# Patient Record
Sex: FEMALE | Race: BLACK OR AFRICAN AMERICAN | Employment: STUDENT | URBAN - METROPOLITAN AREA
[De-identification: names, ages, dates, MRNs, and addresses within clinical notes are randomized per-mention and may not be internally consistent; named-entity substitution may affect disease eponyms.]

---

## 2019-06-04 ENCOUNTER — OFFICE VISIT (OUTPATIENT)
Dept: ORTHOPEDIC SURGERY | Age: 29
End: 2019-06-04

## 2019-06-04 VITALS
OXYGEN SATURATION: 98 % | TEMPERATURE: 96.2 F | DIASTOLIC BLOOD PRESSURE: 63 MMHG | WEIGHT: 151.4 LBS | HEIGHT: 65 IN | HEART RATE: 62 BPM | RESPIRATION RATE: 16 BRPM | BODY MASS INDEX: 25.22 KG/M2 | SYSTOLIC BLOOD PRESSURE: 98 MMHG

## 2019-06-04 DIAGNOSIS — M76.52 PATELLAR TENDINITIS, LEFT KNEE: Primary | ICD-10-CM

## 2019-06-04 DIAGNOSIS — M25.562 LEFT KNEE PAIN, UNSPECIFIED CHRONICITY: ICD-10-CM

## 2019-06-04 RX ORDER — MELOXICAM 15 MG/1
15 TABLET ORAL
Qty: 30 TAB | Refills: 1 | Status: SHIPPED | OUTPATIENT
Start: 2019-06-04

## 2019-06-04 NOTE — PROGRESS NOTES
Mona Orozco  1990   Chief Complaint   Patient presents with    Knee Pain     LEFT KNEE PAIN        HISTORY OF PRESENT ILLNESS  Mona Orozco is a 34 y.o. female who presents today for evaluation of left knee pain. She rates her pain 0/10 today. Pain has been present for a few months after a fall at boot camp. She works as a  at [employer]. Patient describes the pain as aching that is Intermittent in nature. Symptoms are worse with prolonged walking, running, Activity and is better with  Rest. Associated symptoms include Swelling. Since problem started, it: is unchanged. Pain does not wake patient up at night. Has taken no meds for the problem. Has tried following treatments: Injections:NO; Brace:NO; Therapy:NO; Cane/Crutch:NO       Allergies   Allergen Reactions    Sulfa (Sulfonamide Antibiotics) Hives        History reviewed. No pertinent past medical history.    Social History     Socioeconomic History    Marital status: SINGLE     Spouse name: Not on file    Number of children: Not on file    Years of education: Not on file    Highest education level: Not on file   Occupational History    Not on file   Social Needs    Financial resource strain: Not on file    Food insecurity:     Worry: Not on file     Inability: Not on file    Transportation needs:     Medical: Not on file     Non-medical: Not on file   Tobacco Use    Smoking status: Never Smoker    Smokeless tobacco: Never Used   Substance and Sexual Activity    Alcohol use: No     Alcohol/week: 0.0 oz    Drug use: Not on file    Sexual activity: Not on file   Lifestyle    Physical activity:     Days per week: Not on file     Minutes per session: Not on file    Stress: Not on file   Relationships    Social connections:     Talks on phone: Not on file     Gets together: Not on file     Attends Advent service: Not on file     Active member of club or organization: Not on file     Attends meetings of clubs or organizations: Not on file     Relationship status: Not on file    Intimate partner violence:     Fear of current or ex partner: Not on file     Emotionally abused: Not on file     Physically abused: Not on file     Forced sexual activity: Not on file   Other Topics Concern    Not on file   Social History Narrative    Not on file      History reviewed. No pertinent surgical history. History reviewed. No pertinent family history. Current Outpatient Medications   Medication Sig    doxycycline (MONODOX) 100 mg capsule Take 100 mg by mouth two (2) times a day.  methocarbamol (ROBAXIN-750) 750 mg tablet Take 1 Tab by mouth four (4) times daily.  acetaminophen-codeine (TYLENOL-CODEINE #3) 300-30 mg per tablet Take 1 Tab by mouth every four (4) hours as needed for Pain. No current facility-administered medications for this visit. REVIEW OF SYSTEM   Patient denies: Weight loss, Fever/Chills, HA, Visual changes, Fatigue, Chest pain, SOB, Abdominal pain, N/V/D/C, Blood in stool or urine, Edema. Pertinent positive as above in HPI. All others were negative    PHYSICAL EXAM:   Visit Vitals  BP 98/63   Pulse 62   Temp 96.2 °F (35.7 °C) (Oral)   Resp 16   Ht 5' 5\" (1.651 m)   Wt 151 lb 6.4 oz (68.7 kg)   SpO2 98%   BMI 25.19 kg/m²     The patient is a well-developed, well-nourished female   in no acute distress. The patient is alert and oriented times three. The patient is alert and oriented times three. Mood and affect are normal.  LYMPHATIC: lymph nodes are not enlarged and are within normal limits  SKIN: normal in color and non tender to palpation. There are no bruises or abrasions noted. NEUROLOGICAL: Motor sensory exam is within normal limits. Reflexes are equal bilaterally.  There is normal sensation to pinprick and light touch  MUSCULOSKELETAL:  Examination Left knee   Skin Intact   Range of motion 0-130   Effusion -   Medial joint line tenderness -   Lateral joint line tenderness -   Tenderness Pes Bursa -   Tenderness insertion MCL -   Tenderness insertion LCL -   Lisandras -   Patella crepitus -   Patella grind -   Lachman -   Pivot shift -   Anterior drawer -   Posterior drawer -   Varus stress -   Valgus stress -   Neurovascular Intact   Calf Swelling and Tenderness to Palpation -   Corby's Test -   Hamstring Cord Tightness +      IMAGING: XR of left knee dated 6/4/19 was reviewed and read: No acute abnormalities. IMPRESSION:      ICD-10-CM ICD-9-CM    1. Patellar tendinitis, left knee M76.52 726.64 meloxicam (MOBIC) 15 mg tablet   2. Left knee pain, unspecified chronicity M25.562 719.46 AMB POC XRAY, KNEE; 1/2 VIEWS      meloxicam (MOBIC) 15 mg tablet        PLAN:  1. The patient presents today with left knee pain due to patellar tendinitis. Given hamstring stretches today. Risk factors include: n/a  2. No ultrasound exam indicated today  3. No cortisone injection indicated today   4. No Physical/Occupational Therapy indicated today  5. No diagnostic test indicated today:   6. No durable medical equipment indicated today  7. No referral indicated today   8. Yes medications indicated today: MOBIC   9. No Narcotic indicated today     RTC 6 weeks if pain continues    Scribed by Kim Gold ([address]) as dictated by Jayro Love MD    I, Dr. Jayro Love, confirm that all documentation is accurate.     Jayro Love M.D.   [facility]

## 2019-06-04 NOTE — LETTER
NOTIFICATION RETURN TO WORK / SCHOOL 
 
6/4/2019 10:12 AM 
 
Ms. Cleveland Hale 
[address] [facility] [address] To Whom It May Concern: 
 
Cleveland Hale is currently under the care of Guerrero Hale. She was seen in our office today, 6/4/19. 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 If there are questions or concerns please have the patient contact our office. Sincerely, Imelda Rice MD

## 2020-04-15 ENCOUNTER — HOSPITAL ENCOUNTER (EMERGENCY)
Age: 30
Discharge: HOME | End: 2020-04-15
Attending: EMERGENCY MEDICINE
Payer: MEDICAID

## 2020-04-15 VITALS
RESPIRATION RATE: 12 BRPM | TEMPERATURE: 97.3 F | BODY MASS INDEX: 23.16 KG/M2 | WEIGHT: 139 LBS | DIASTOLIC BLOOD PRESSURE: 65 MMHG | HEART RATE: 66 BPM | SYSTOLIC BLOOD PRESSURE: 108 MMHG | OXYGEN SATURATION: 100 % | HEIGHT: 65 IN

## 2020-04-15 DIAGNOSIS — L23.9 ALLERGIC DERMATITIS: Primary | ICD-10-CM

## 2020-04-15 PROCEDURE — 99281 EMR DPT VST MAYX REQ PHY/QHP: CPT

## 2020-04-15 RX ORDER — PREDNISONE 20 MG/1
40 TABLET ORAL DAILY
Qty: 10 TAB | Refills: 0 | Status: SHIPPED | OUTPATIENT
Start: 2020-04-15 | End: 2020-04-20

## 2020-04-15 NOTE — ED PROVIDER NOTES
EMERGENCY DEPARTMENT HISTORY AND PHYSICAL EXAM    3:52 PM      Date: 4/15/2020  Patient Name: Gaetano Ricketts    History of Presenting Illness     Chief Complaint   Patient presents with    Rash         History Provided By: Patient  Location/Duration/Severity/Modifying factors   Patient is a 60-year-old female with no significant past medical history presenting to the emergency department after experiencing several weeks of worsening pruritic rash. Patient reports that rash started on her hands and has spread to her neck face and forearms. She states that the rash is very itchy but she denies any previous history of pustules or draining skin lesions. She states that she has been seen previously by urgent care several times for this issue and given triamcinolone cream, ketoconazole cream, and an antihistamine which did not help her symptoms. 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 She states that she wears protective clothing and a respirator at all times when around the paint denies any history of similar symptoms. She denies any fever, shortness of breath, chest pain, abdominal pain, dysuria. She reports allergy to sulfur. PCP: None    Current Outpatient Medications   Medication Sig Dispense Refill    predniSONE (DELTASONE) 20 mg tablet Take 40 mg by mouth daily for 5 days. With Breakfast 10 Tab 0    meloxicam (MOBIC) 15 mg tablet Take 1 Tab by mouth daily (with breakfast). 30 Tab 1    doxycycline (MONODOX) 100 mg capsule Take 100 mg by mouth two (2) times a day.  methocarbamol (ROBAXIN-750) 750 mg tablet Take 1 Tab by mouth four (4) times daily. 20 Tab 0    acetaminophen-codeine (TYLENOL-CODEINE #3) 300-30 mg per tablet Take 1 Tab by mouth every four (4) hours as needed for Pain. 10 Tab 0       Past History     Past Medical History:  History reviewed. No pertinent past medical history.     Past Surgical History:  History reviewed. No pertinent surgical history. Family History:  History reviewed. No pertinent family history. Social History:  Social History     Tobacco Use    Smoking status: Never Smoker    Smokeless tobacco: Never Used   Substance Use Topics    Alcohol use: No     Alcohol/week: 0.0 standard drinks    Drug use: Not on file       Allergies: Allergies   Allergen Reactions    Sulfa (Sulfonamide Antibiotics) Hives         Review of Systems       Review of Systems   Constitutional: Negative for fever. HENT: Negative for sore throat. Eyes: Negative for visual disturbance. Respiratory: Negative for cough, chest tightness, shortness of breath and wheezing. Cardiovascular: Negative for chest pain. Gastrointestinal: Negative for abdominal pain, nausea and vomiting. Genitourinary: Negative for dysuria. Skin: Positive for rash. Negative for color change, pallor and wound. Neurological: Negative for seizures and syncope. Physical Exam     Visit Vitals  /65 (BP 1 Location: Left arm, BP Patient Position: At rest)   Pulse 66   Temp 97.3 °F (36.3 °C)   Resp 12   Ht 5' 5\" (1.651 m)   Wt 63 kg (139 lb)   SpO2 100%   BMI 23.13 kg/m²         Physical Exam  Vitals signs and nursing note reviewed. Constitutional:       General: She is not in acute distress. Appearance: Normal appearance. She is normal weight. She is not ill-appearing, toxic-appearing or diaphoretic. HENT:      Head: Normocephalic and atraumatic. Cardiovascular:      Rate and Rhythm: Normal rate and regular rhythm. Pulses: Normal pulses. Heart sounds: Normal heart sounds. No murmur. No friction rub. No gallop. Pulmonary:      Effort: Pulmonary effort is normal. No respiratory distress. Breath sounds: Normal breath sounds. No stridor. No wheezing, rhonchi or rales. Chest:      Chest wall: No tenderness. Abdominal:      General: Abdomen is flat. Palpations: Abdomen is soft. Tenderness:  There is no abdominal tenderness. Musculoskeletal: Normal range of motion. Skin:     General: Skin is warm and dry. Capillary Refill: Capillary refill takes less than 2 seconds. Findings: Rash present. Comments: Mildly erythematous maculopapular rash. No pustules or severe excoriation noted. Rash is difficult to appreciate on extremities but is more prevalent on her cheekbones bilaterally. Neurological:      General: No focal deficit present. Mental Status: She is alert and oriented to person, place, and time. Mental status is at baseline. Psychiatric:         Mood and Affect: Mood normal.         Behavior: Behavior normal.         Thought Content: Thought content normal.         Judgment: Judgment normal.           Diagnostic Study Results     Labs -  No results found for this or any previous visit (from the past 12 hour(s)). Radiologic Studies -   No orders to display         Medical Decision Making   I am the first provider for this patient. I reviewed the vital signs, available nursing notes, past medical history, past surgical history, family history and social history. Vital Signs-Reviewed the patient's vital signs. EKG: None obtained    Records Reviewed: Nursing Notes and Old Medical Records (Time of Review: 3:52 PM)    ED Course: Progress Notes, Reevaluation, and Consults:         Provider Notes (Medical Decision Making):   MDM  Number of Diagnoses or Management Options  Allergic dermatitis:   Diagnosis management comments: Patient is a 82-year-old otherwise healthy female presenting to the emergency department after experiencing several weeks of spreading pruritic rash on the hands neck and face. Patient has tried and failed treatments with triamcinolone, ketoconazole, and oral antihistamine. Patient is exposed to chemicals on a daily basis at work but denies any previous problems with allergies.   Physical exam reassuring against insect bites such as bedbugs or scabies. I do not appear to be any pustules or concerning symptoms such as fevers. Patient is likely experiencing uncontrolled contact dermatitis likely from chemicals at work. Patient was counseled that allergies can develop at any time and it is possible she is developed an allergy to the chemicals at her work. This is supported by the fact that she wears a respirator at work and has not experienced rash symptoms wear a respirator has been sitting. Patient has tried topical treatments for allergic dermatitis and the next up would likely be a short course of oral steroids. Patient to be discharged home with prescription for 5 days of oral prednisone and instructions for following up with dermatology for further evaluation. Patient was also given return precautions and instructions to follow-up with her primary care. Patient verbalized understanding of these instructions and was comfortable with discharge at this time. Procedures: none    Critical Care Time: none      Diagnosis     Clinical Impression:   1. Allergic dermatitis        Disposition: Discharge home. Follow-up Information     Follow up With Specialties Details Why Contact Info    Your primary care doctor  Schedule an appointment as soon as possible for a visit      CR DERMATOLOGY Dermatology Schedule an appointment as soon as possible for a visit  [address] [facility] EMERGENCY DEPT Emergency Medicine Go to As needed, If symptoms worsen [address] [place] [id number]  [phone]           Patient's Medications   Start Taking    PREDNISONE (DELTASONE) 20 MG TABLET    Take 40 mg by mouth daily for 5 days. With Breakfast   Continue Taking    ACETAMINOPHEN-CODEINE (TYLENOL-CODEINE #3) 300-30 MG PER TABLET    Take 1 Tab by mouth every four (4) hours as needed for Pain. DOXYCYCLINE (MONODOX) 100 MG CAPSULE    Take 100 mg by mouth two (2) times a day.     MELOXICAM (MOBIC) 15 MG TABLET    Take 1 Tab by mouth daily (with breakfast). METHOCARBAMOL (ROBAXIN-750) 750 MG TABLET    Take 1 Tab by mouth four (4) times daily. These Medications have changed    No medications on file   Stop Taking    No medications on file     Disclaimer: Sections of this note are dictated using utilizing voice recognition software. Minor typographical errors may be present. If questions arise, please do not hesitate to contact me or call our department. Denny Rolle MD  [address] [place] [address] PGY-1

## 2020-04-15 NOTE — LETTER
[facility] EMERGENCY DEPT 
[address] [facility] [address] [phone] Work Note Date: 4/15/2020 To Whom It May concern: 
 
Dayna Ta was seen and treated today in the emergency department by Dr. Maryam Levine. Dayna Ta may return to work today.  
 
Sincerely, 
 
 
 
 
Javier Paiz MD

## 2020-04-15 NOTE — ED TRIAGE NOTES
\"I'm a  for the [employer]. I've been breaking out on my hands, but now the rash has started on my face and neck. It very itchy. \"  The patient is currently on Hydroxyzine HCL 25 mg Q 8 hours, Triamcinolone 0.1% cream, and Ketoconazole 2% cream, but have not been effective.

## 2020-04-15 NOTE — DISCHARGE INSTRUCTIONS
Patient Education        Dermatitis: Care Instructions  Your Care Instructions  Dermatitis is the general name used for any rash or inflammation of the skin. Different kinds of dermatitis cause different kinds of rashes. Common causes of a rash include new medicines, plants (such as poison oak or poison ivy), heat, and stress. Certain illnesses can also cause a rash. An allergic reaction to something that touches your skin, such as latex, nickel, or poison ivy, is called contact dermatitis. Contact dermatitis may also be caused by something that irritates the skin, such as bleach, a chemical, or soap. These types of rashes cannot be spread from person to person. How long your rash will last depends on what caused it. Rashes may last a few days or months. Follow-up care is a key part of your treatment and safety. Be sure to make and go to all appointments, and call your doctor if you are having problems. It's also a good idea to know your test results and keep a list of the medicines you take. How can you care for yourself at home? · Do not scratch the rash. Cut your nails short, and file them smooth. Or wear gloves if this helps keep you from scratching. · Wash the area with water only. Pat dry. · Put cold, wet cloths on the rash to reduce itching. · Keep cool, and stay out of the sun. · Leave the rash open to the air as much as possible. · If the rash itches, use hydrocortisone cream. Follow the directions on the label. Calamine lotion may help for plant rashes. · Take an over-the-counter antihistamine, such as diphenhydramine (Benadryl) or loratadine (Claritin), to help calm the itching. Read and follow all instructions on the label. · If your doctor prescribed a cream, use it as directed. If your doctor prescribed medicine, take it exactly as directed. When should you call for help?   Call your doctor now or seek immediate medical care if:    · You have symptoms of infection, such as:  ? Increased pain, swelling, warmth, or redness. ? Red streaks leading from the area. ? Pus draining from the area. ? A fever.     · You have joint pain along with the rash.    Watch closely for changes in your health, and be sure to contact your doctor if:    · Your rash is changing or getting worse.     · You are not getting better as expected. Where can you learn more? Go to http://elfego-rey.info/  Enter F270 in the search box to learn more about \"Dermatitis: Care Instructions. \"  Current as of: October 30, 2019Content Version: 12.4  © 2569-3079 [employer]wise[employer] Incorporated. Care instructions adapted under license by [employer] (which disclaims liability or warranty for this information). If you have questions about a medical condition or this instruction, always ask your healthcare professional. [address] any warranty or liability for your use of this information.

## 2022-09-16 ENCOUNTER — OFFICE VISIT (OUTPATIENT)
Dept: ORTHOPEDIC SURGERY | Age: 32
End: 2022-09-16
Payer: COMMERCIAL

## 2022-09-16 VITALS
TEMPERATURE: 98.4 F | BODY MASS INDEX: 23.82 KG/M2 | HEART RATE: 97 BPM | OXYGEN SATURATION: 97 % | WEIGHT: 143 LBS | HEIGHT: 65 IN

## 2022-09-16 DIAGNOSIS — G35 MULTIPLE SCLEROSIS (HCC): ICD-10-CM

## 2022-09-16 DIAGNOSIS — M79.602 LEFT ARM PAIN: ICD-10-CM

## 2022-09-16 DIAGNOSIS — G56.02 LEFT CARPAL TUNNEL SYNDROME: ICD-10-CM

## 2022-09-16 DIAGNOSIS — S56.912A FOREARM STRAIN, LEFT, INITIAL ENCOUNTER: Primary | ICD-10-CM

## 2022-09-16 PROCEDURE — 99203 OFFICE O/P NEW LOW 30 MIN: CPT | Performed by: SPECIALIST

## 2022-09-16 RX ORDER — DICLOFENAC SODIUM 10 MG/G
GEL TOPICAL 4 TIMES DAILY
Qty: 5 EACH | Refills: 5 | Status: SHIPPED | OUTPATIENT
Start: 2022-09-16

## 2022-09-16 NOTE — LETTER
NOTIFICATION RETURN TO WORK     9/16/2022 2:51 PM    Ms. Cleveland Hale  [facility] [address] [id number]      To Whom It May Concern:    Cleveland Hale is currently under the care of [address] [place] [address]. She will return to work on: 9-21-22. Please excuse her from work until then. If there are questions or concerns please have the patient contact our office.         Sincerely,        Olga Monte MD

## 2022-09-16 NOTE — PROGRESS NOTES
Patient: Dayna Ta                MRN: [id number]       SSN: [id number]  YOB: 1990        AGE: 29 y.o. SEX: female    PCP: None  09/16/22    CC: LEFT WRIST PAIN AND NUMBNESS    HISTORY:  Dayna Ta is a 29 y.o. female who is seen for left wrist pain and numbness. She has been experiencing pain and numbness in her left wrist/arm for the past couple of weeks. She was lifting a hamper of clothes at home when she first felt the pain. She has trouble with putting pressure on the area and with lifting. She feels numbness in her forearm and fingers. She is right handed. She was diagnosed with carpel tunnel syndrome within this pat year. She was diagnosed with multiple sclerosis within this past year as well. Takes dimethyl fumarate 240 mg twice a day. Pain Assessment  9/16/2022   Location of Pain Wrist;Hand   Location Modifiers Left   Severity of Pain 3   Quality of Pain Sharp   Quality of Pain Comment -   Duration of Pain Persistent   Frequency of Pain Intermittent   Date Pain First Started (No Data)   Date Pain First Started Comment two weeks ago   Aggravating Factors Other (Comment)   Aggravating Factors Comment weight of objects or moving objects   Limiting Behavior No   Relieving Factors Rest;Ice   Result of Injury No     Occupation, etc:  Ms. Pinky Chase works as a  for [employer]. She lives in [place] with her mom, dad, brother and 2 aunts. Ms. Pinky Chase weighs 143 lbs and is 5'5\" tall. She is not diabetic. She has a h/o multiple sclerosis diagnosed last year by her neurologist in [place].       No results found for: HBA1C, TEJ3IAAA, BUP1CRLG, MVC2UJUP  Weight Metrics 9/16/2022 4/15/2020 6/4/2019 6/27/2016 1/8/2014   Weight 143 lb 139 lb 151 lb 6.4 oz 141 lb 150 lb   BMI 23.8 kg/m2 23.13 kg/m2 25.19 kg/m2 23.46 kg/m2 24.96 kg/m2       Patient Active Problem List   Diagnosis Code    Carpal tunnel syndrome on both sides G56.03 Cubital tunnel syndrome, bilateral G56.23     REVIEW OF SYSTEMS:    Constitutional Symptoms: Negative   Eyes: Negative   Ears, Nose, Throat and Mouth: Negative   Cardiovascular: Negative   Respiratory: Negative   Genitourinary: Per HPI   Gastrointestinal: Per HPI   Integumentary (Skin and/or Breast): Negative   Musculoskeletal: Per HPI   Endocrine/Rheumatologic: Negative   Neurological: Per HPI   Hematology/Lymphatic: Negative    Allergic/Immunologic: Negative   Phychiatric: Negative    Social History     Socioeconomic History    Marital status: SINGLE     Spouse name: Not on file    Number of children: Not on file    Years of education: Not on file    Highest education level: Not on file   Occupational History    Not on file   Tobacco Use    Smoking status: Never    Smokeless tobacco: Never   Substance and Sexual Activity    Alcohol use: No     Alcohol/week: 0.0 standard drinks    Drug use: Not on file    Sexual activity: Not on file   Other Topics Concern    Not on file   Social History Narrative    Not on file     Social Determinants of Health     Financial Resource Strain: Not on file   Food Insecurity: Not on file   Transportation Needs: Not on file   Physical Activity: Not on file   Stress: Not on file   Social Connections: Not on file   Intimate Partner Violence: Not on file   Housing Stability: Not on file      Allergies   Allergen Reactions    Sulfa (Sulfonamide Antibiotics) Hives      Current Outpatient Medications   Medication Sig    diclofenac (VOLTAREN) 1 % gel Apply  to affected area four (4) times daily. Apply 4g to affected area up to four times daily. acetaminophen-codeine (TYLENOL-CODEINE #3) 300-30 mg per tablet Take 1 Tab by mouth every four (4) hours as needed for Pain.    meloxicam (MOBIC) 15 mg tablet Take 1 Tab by mouth daily (with breakfast). (Patient not taking: Reported on 9/16/2022)    doxycycline (MONODOX) 100 mg capsule Take 100 mg by mouth two (2) times a day.  (Patient not taking: Reported on 9/16/2022)    methocarbamol (ROBAXIN-750) 750 mg tablet Take 1 Tab by mouth four (4) times daily. (Patient not taking: Reported on 9/16/2022)     No current facility-administered medications for this visit. PHYSICAL EXAMINATION:  Visit Vitals  Pulse 97   Temp 98.4 °F (36.9 °C) (Temporal)   Ht 5' 5\" (1.651 m)   Wt 143 lb (64.9 kg)   SpO2 97%   BMI 23.80 kg/m²      ORTHO EXAMINATION:  Examination Right Wrist Left Wrist   Skin Intact Intact   Tenderness - Dorsal forearm tenderness   Flexion 40 40   Extension 30 30   Deformity - -   Effusion - -   Finger flexion Full Full   Finger extension Full Full   Tinel's sign - -   Phalen's test - -   Finklestein maneuver - -   Pain with thumb abduction - -   Capillary refill - -   Wearing a wrist splint   Dorsal forearm pain with resisted wrist and finger extension    Examination Right Hand Left Hand   Skin Intact Intact   Deformity - -   Swelling - -   Tenderness - -   Finger flexion Full Full   Finger extension Full Full   Sensation Normal Normal   Capillary refill Normal Normal   Heberden's nodes - -   Dupuytren's - -     RADIOGRAPHS:  XR LEFT FOREARM 9/16/22 DAVID  IMPRESSION:  Two views - No fracture. IMPRESSION:      ICD-10-CM ICD-9-CM    1. Forearm strain, left, initial encounter  S56.912A 841.9 diclofenac (VOLTAREN) 1 % gel      AMB POC XRAY; FOREARM, 2 VIEWS      2. Left arm pain  M79.602 729.5 diclofenac (VOLTAREN) 1 % gel      AMB POC XRAY; FOREARM, 2 VIEWS      3. Left carpal tunnel syndrome  G56.02 354.0       4. Multiple sclerosis ([facility] [address]  G35 340           PLAN: A note was provided to keep her out of work until 9/21/22. A prescription for Voltaren Gel was provided. There is no need for surgery or injection at this time. She will follow up as needed.       Scribed by Gracie Moon (Janiya Spears) as dictated by Xena Piper MD

## 2022-09-16 NOTE — LETTER
NOTIFICATION RETURN TO WORK / SCHOOL    9/16/2022 2:50 PM    Ms. Cleveland Hale  [place] [phone]      To Whom It May Concern:    Cleveland Hale is currently under the care of [address] [place] [address]. She will return to work/school on: 9/21/22    If there are questions or concerns please have the patient contact our office.         Sincerely,      Xena Piper MD

## 2025-05-23 ENCOUNTER — HOSPITAL ENCOUNTER (EMERGENCY)
Facility: HOSPITAL | Age: 35
Discharge: HOME | End: 2025-05-23
Attending: EMERGENCY MEDICINE
Payer: COMMERCIAL

## 2025-05-23 ENCOUNTER — APPOINTMENT (OUTPATIENT)
Facility: HOSPITAL | Age: 35
End: 2025-05-23
Payer: COMMERCIAL

## 2025-05-23 VITALS
DIASTOLIC BLOOD PRESSURE: 71 MMHG | OXYGEN SATURATION: 100 % | BODY MASS INDEX: 24.99 KG/M2 | WEIGHT: 150 LBS | SYSTOLIC BLOOD PRESSURE: 110 MMHG | TEMPERATURE: 98.2 F | RESPIRATION RATE: 18 BRPM | HEIGHT: 65 IN | HEART RATE: 74 BPM

## 2025-05-23 DIAGNOSIS — Z34.90 INTRAUTERINE PREGNANCY: Primary | ICD-10-CM

## 2025-05-23 DIAGNOSIS — B37.9 YEAST INFECTION: ICD-10-CM

## 2025-05-23 DIAGNOSIS — O20.0 THREATENED MISCARRIAGE: ICD-10-CM

## 2025-05-23 LAB
ALBUMIN SERPL-MCNC: 3.4 G/DL (ref 3.4–5)
ALBUMIN/GLOB SERPL: 1.1 (ref 0.8–1.7)
ALP SERPL-CCNC: 95 U/L (ref 45–117)
ALT SERPL-CCNC: 18 U/L (ref 10–35)
ANION GAP SERPL CALC-SCNC: 13 MMOL/L (ref 3–18)
APPEARANCE UR: CLEAR
AST SERPL-CCNC: 26 U/L (ref 10–38)
BASOPHILS # BLD: 0.02 K/UL (ref 0–0.1)
BASOPHILS NFR BLD: 0.3 % (ref 0–2)
BILIRUB DIRECT SERPL-MCNC: <0.2 MG/DL (ref 0–0.2)
BILIRUB SERPL-MCNC: 0.3 MG/DL (ref 0.2–1)
BILIRUB UR QL: NEGATIVE
BUN SERPL-MCNC: 10 MG/DL (ref 6–23)
BUN/CREAT SERPL: 13 (ref 12–20)
CALCIUM SERPL-MCNC: 9.2 MG/DL (ref 8.5–10.1)
CHLORIDE SERPL-SCNC: 104 MMOL/L (ref 98–107)
CO2 SERPL-SCNC: 20 MMOL/L (ref 21–32)
COLOR UR: YELLOW
CREAT SERPL-MCNC: 0.73 MG/DL (ref 0.6–1.3)
DIFFERENTIAL METHOD BLD: ABNORMAL
EOSINOPHIL # BLD: 0.07 K/UL (ref 0–0.4)
EOSINOPHIL NFR BLD: 1.1 % (ref 0–5)
ERYTHROCYTE [DISTWIDTH] IN BLOOD BY AUTOMATED COUNT: 13.9 % (ref 11.6–14.5)
GLOBULIN SER CALC-MCNC: 3.1 G/DL (ref 2–4)
GLUCOSE SERPL-MCNC: 93 MG/DL (ref 74–108)
GLUCOSE UR STRIP.AUTO-MCNC: NEGATIVE MG/DL
HCG SERPL-ACNC: ABNORMAL MIU/ML (ref 0–10)
HCT VFR BLD AUTO: 41.6 % (ref 35–45)
HGB BLD-MCNC: 13.1 G/DL (ref 12–16)
HGB UR QL STRIP: NEGATIVE
IMM GRANULOCYTES # BLD AUTO: 0.02 K/UL (ref 0–0.04)
IMM GRANULOCYTES NFR BLD AUTO: 0.3 % (ref 0–0.5)
KETONES UR QL STRIP.AUTO: NEGATIVE MG/DL
LEUKOCYTE ESTERASE UR QL STRIP.AUTO: NEGATIVE
LIPASE SERPL-CCNC: 30 U/L (ref 13–75)
LYMPHOCYTES # BLD: 1.14 K/UL (ref 0.9–3.6)
LYMPHOCYTES NFR BLD: 17.2 % (ref 21–52)
MCH RBC QN AUTO: 25.9 PG (ref 24–34)
MCHC RBC AUTO-ENTMCNC: 31.5 G/DL (ref 31–37)
MCV RBC AUTO: 82.2 FL (ref 78–100)
MONOCYTES # BLD: 0.71 K/UL (ref 0.05–1.2)
MONOCYTES NFR BLD: 10.7 % (ref 3–10)
NEUTS SEG # BLD: 4.66 K/UL (ref 1.8–8)
NEUTS SEG NFR BLD: 70.4 % (ref 40–73)
NITRITE UR QL STRIP.AUTO: NEGATIVE
NRBC # BLD: 0 K/UL (ref 0–0.01)
NRBC BLD-RTO: 0 PER 100 WBC
PH UR STRIP: 6 (ref 5–8)
PLATELET # BLD AUTO: 214 K/UL (ref 135–420)
PMV BLD AUTO: 10.7 FL (ref 9.2–11.8)
POTASSIUM SERPL-SCNC: 4.2 MMOL/L (ref 3.5–5.5)
PROT SERPL-MCNC: 6.5 G/DL (ref 6.4–8.2)
PROT UR STRIP-MCNC: NEGATIVE MG/DL
RBC # BLD AUTO: 5.06 M/UL (ref 4.2–5.3)
SERVICE CMNT-IMP: NORMAL
SODIUM SERPL-SCNC: 136 MMOL/L (ref 136–145)
SP GR UR REFRACTOMETRY: 1.01 (ref 1–1.03)
UROBILINOGEN UR QL STRIP.AUTO: 0.2 EU/DL (ref 0.2–1)
WBC # BLD AUTO: 6.6 K/UL (ref 4.6–13.2)
WET PREP GENITAL: NORMAL

## 2025-05-23 PROCEDURE — 94761 N-INVAS EAR/PLS OXIMETRY MLT: CPT

## 2025-05-23 PROCEDURE — 80048 BASIC METABOLIC PNL TOTAL CA: CPT

## 2025-05-23 PROCEDURE — 93975 VASCULAR STUDY: CPT

## 2025-05-23 PROCEDURE — 81003 URINALYSIS AUTO W/O SCOPE: CPT

## 2025-05-23 PROCEDURE — 87661 TRICHOMONAS VAGINALIS AMPLIF: CPT

## 2025-05-23 PROCEDURE — 87591 N.GONORRHOEAE DNA AMP PROB: CPT

## 2025-05-23 PROCEDURE — 85025 COMPLETE CBC W/AUTO DIFF WBC: CPT

## 2025-05-23 PROCEDURE — 80076 HEPATIC FUNCTION PANEL: CPT

## 2025-05-23 PROCEDURE — 87210 SMEAR WET MOUNT SALINE/INK: CPT

## 2025-05-23 PROCEDURE — 84702 CHORIONIC GONADOTROPIN TEST: CPT

## 2025-05-23 PROCEDURE — 83690 ASSAY OF LIPASE: CPT

## 2025-05-23 PROCEDURE — 87491 CHLMYD TRACH DNA AMP PROBE: CPT

## 2025-05-23 PROCEDURE — 99284 EMERGENCY DEPT VISIT MOD MDM: CPT

## 2025-05-23 RX ORDER — BACILLUS COAGULANS 1B CELL
CAPSULE ORAL NIGHTLY
Qty: 1 KIT | Refills: 0 | Status: SHIPPED | OUTPATIENT
Start: 2025-05-23 | End: 2025-05-26

## 2025-05-23 RX ORDER — ACETAMINOPHEN 500 MG
1000 TABLET ORAL
Status: DISCONTINUED | OUTPATIENT
Start: 2025-05-23 | End: 2025-05-23 | Stop reason: HOSPADM

## 2025-05-23 ASSESSMENT — PAIN SCALES - GENERAL: PAINLEVEL_OUTOF10: 6

## 2025-05-23 ASSESSMENT — PAIN - FUNCTIONAL ASSESSMENT: PAIN_FUNCTIONAL_ASSESSMENT: 0-10

## 2025-05-23 ASSESSMENT — PAIN DESCRIPTION - PAIN TYPE: TYPE: ACUTE PAIN

## 2025-05-23 ASSESSMENT — LIFESTYLE VARIABLES
HOW MANY STANDARD DRINKS CONTAINING ALCOHOL DO YOU HAVE ON A TYPICAL DAY: PATIENT DOES NOT DRINK
HOW OFTEN DO YOU HAVE A DRINK CONTAINING ALCOHOL: NEVER

## 2025-05-23 ASSESSMENT — PAIN DESCRIPTION - LOCATION: LOCATION: ABDOMEN

## 2025-05-23 ASSESSMENT — PAIN DESCRIPTION - ORIENTATION: ORIENTATION: RIGHT;LEFT

## 2025-05-23 ASSESSMENT — PAIN DESCRIPTION - DESCRIPTORS: DESCRIPTORS: CRAMPING

## 2025-05-23 NOTE — ED NOTES
Started an IV, sent blood work   Connected pt to monitor   Pt did not want to changed into gown   Bed in lowest position and pt has call bell

## 2025-05-23 NOTE — ED NOTES
Discharge medications reviewed with the patient and appropriate educational materials and side effects teaching were provided. Patient has no further questions or concerns. Patient escorted to waiting area at this time.

## 2025-05-23 NOTE — ED NOTES
Pt returned from ultrasound and placed in room 15. Pt alert and oriented x4. Patient questions what is next to be completed and verbalizes wanting to go home. Dr. Brandon notified.

## 2025-05-23 NOTE — ED PROVIDER NOTES
[facility] EMERGENCY DEPARTMENT  EMERGENCY DEPARTMENT ENCOUNTER      Pt Name: Amanda Meyer  MRN: [id number]  Birthdate 1990  Date of evaluation: 2025  Provider: Zaki Brandon MD  2:31 PM    CHIEF COMPLAINT       Chief Complaint   Patient presents with    Abdominal Pain    Cramping during pregnancy          HISTORY OF PRESENT ILLNESS    Amanda Meyer is a 35 y.o. female who presents to the emergency department      35 female  A1 here with intermittent pelvic pain, cramping over the last week, just found out she was pregnant yesterday was seen in an ER up in [facility] had an ultrasound done that showed an IUP and was told to follow-up with OB.  She has a history of MS, she is very concerned as she is scheduled to get an infusion for her MS on Thursday and does not know if this is compatible with pregnancy, she is unable to get into her OB until after the infusion is scheduled next week.  She is also concerned if the baby may be miscarrying since she continues to have abdominal pain, she has had no vaginal bleeding denies any discharge or dysuria.  Prior pregnancy was when she was 17 years old and she had a elective .  Otherwise healthy, been in her normal state of health, denies any recent illnesses, no chest pain or shortness of breath, able to tolerate p.o. without any issues.          Nursing Notes were reviewed.    REVIEW OF SYSTEMS       Review of Systems    Except as noted above the remainder of the review of systems was reviewed and negative.       PAST MEDICAL HISTORY     Past Medical History:   Diagnosis Date    MS (multiple sclerosis) ([facility]) 2021         SURGICAL HISTORY     No past surgical history on file.      CURRENT MEDICATIONS       Discharge Medication List as of 2025 12:29 PM        CONTINUE these medications which have NOT CHANGED    Details   acetaminophen-codeine (TYLENOL #3) 300-30 MG per tablet Take 1 tablet by mouth  outreached hands: No tremor  Restlessness observed during assessment: Able to sit still  Yawning observed during assessment: No yawning  Pupil size: Pinned or normal size for room light  Anxiety or Irritability: None  Bone or joint aches: Not present  Gooseflesh skin: Skin is smooth  Running Nose or tearing: Not present  Clinical Opiate Withdrawal Scale Score: 0          CIWA Assessment  BP: 110/71  Pulse: 74                 PHYSICAL EXAM       ED Triage Vitals [05/23/25 0525]   BP Systolic BP Percentile Diastolic BP Percentile Temp Temp Source Pulse Respirations SpO2   97/66 -- -- 98.2 °F (36.8 °C) Oral 74 16 100 %      Height Weight - Scale         1.651 m (5' 5\") 68 kg (150 lb)             Physical Exam  Vitals and nursing note reviewed.   Constitutional:       General: She is not in acute distress.     Appearance: Normal appearance.   HENT:      Head: Normocephalic and atraumatic.      Nose: No congestion.      Mouth/Throat:      Mouth: Mucous membranes are moist.      Pharynx: No oropharyngeal exudate.   Eyes:      General: No scleral icterus.     Extraocular Movements: Extraocular movements intact.      Pupils: Pupils are equal, round, and reactive to light.   Cardiovascular:      Rate and Rhythm: Normal rate and regular rhythm.      Pulses: Normal pulses.   Pulmonary:      Effort: Pulmonary effort is normal. No respiratory distress.      Breath sounds: Normal breath sounds.   Abdominal:      General: There is no distension.      Palpations: Abdomen is soft.      Tenderness: There is no abdominal tenderness. There is no right CVA tenderness, left CVA tenderness, guarding or rebound.   Genitourinary:     Vagina: Vaginal discharge present.      Cervix: Normal. No cervical motion tenderness.      Comments: Pelvic exam unremarkable except she does have some white cottage cheese appearing discharge, mild to moderate, no cervical motion tenderness, no bleeding  Musculoskeletal:         General: No swelling or

## 2025-05-23 NOTE — DISCHARGE INSTRUCTIONS
Return to the ER for severe pain, difficulty breathing, inability tolerate food or foods of mouth, new fever, new vaginal bleeding that is soaking through more than 2 pads per hour for more than 4 hours.  As discussed, please follow-up with your OB/GYN as soon as possible to discuss your medication infusion for multiple sclerosis.

## 2025-05-23 NOTE — ED NOTES
Bedside and Verbal shift change report given to Mag RN (oncoming nurse) by Tate RN (offgoing nurse). Report included the following information Nurse Handoff Report, ED Encounter Summary, and ED SBAR.

## 2025-05-23 NOTE — ED NOTES
Pt has not told bio-father yet. Pt request that when he is present in the room that he steps out for health updated

## 2025-05-23 NOTE — ED NOTES
Pt ambulates to the bathroom. Urine provided and sent to lab. Pt does not complain of pain or trouble urinating.   Pt does not feel comfortable self swabing at this time. Provider stated he will do it during the pelvic exam.

## 2025-05-28 LAB
C TRACH RRNA SPEC QL NAA+PROBE: NEGATIVE
N GONORRHOEA RRNA SPEC QL NAA+PROBE: NEGATIVE
SPECIMEN SOURCE: NORMAL
T VAGINALIS RRNA SPEC QL NAA+PROBE: NEGATIVE